# Patient Record
Sex: FEMALE | Race: WHITE | Employment: OTHER | URBAN - NONMETROPOLITAN AREA
[De-identification: names, ages, dates, MRNs, and addresses within clinical notes are randomized per-mention and may not be internally consistent; named-entity substitution may affect disease eponyms.]

---

## 2017-05-18 ENCOUNTER — HOSPITAL ENCOUNTER (OUTPATIENT)
Dept: WOMENS IMAGING | Age: 67
Discharge: HOME | End: 2017-05-18
Payer: COMMERCIAL

## 2017-05-18 DIAGNOSIS — Z12.31 SCREENING MAMMOGRAM, ENCOUNTER FOR: ICD-10-CM

## 2017-05-18 PROCEDURE — G0202 SCR MAMMO BI INCL CAD: HCPCS

## 2019-04-04 ENCOUNTER — HOSPITAL ENCOUNTER (OUTPATIENT)
Dept: WOMENS IMAGING | Age: 69
Discharge: HOME | End: 2019-04-06
Payer: MEDICARE

## 2019-04-04 DIAGNOSIS — Z12.31 VISIT FOR SCREENING MAMMOGRAM: ICD-10-CM

## 2019-04-04 PROCEDURE — 77063 BREAST TOMOSYNTHESIS BI: CPT

## 2021-04-22 ENCOUNTER — OFFICE VISIT (OUTPATIENT)
Dept: OTOLARYNGOLOGY | Age: 71
End: 2021-04-22
Payer: MEDICARE

## 2021-04-22 VITALS
SYSTOLIC BLOOD PRESSURE: 139 MMHG | BODY MASS INDEX: 22.86 KG/M2 | WEIGHT: 125 LBS | TEMPERATURE: 97.4 F | DIASTOLIC BLOOD PRESSURE: 82 MMHG | HEART RATE: 93 BPM

## 2021-04-22 DIAGNOSIS — H93.13 TINNITUS OF BOTH EARS: ICD-10-CM

## 2021-04-22 DIAGNOSIS — R42 VERTIGO: Primary | ICD-10-CM

## 2021-04-22 DIAGNOSIS — M54.2 POSTERIOR NECK PAIN: ICD-10-CM

## 2021-04-22 PROCEDURE — 99204 OFFICE O/P NEW MOD 45 MIN: CPT | Performed by: PHYSICIAN ASSISTANT

## 2021-04-22 ASSESSMENT — ENCOUNTER SYMPTOMS
SINUS PRESSURE: 0
RECTAL PAIN: 0
SHORTNESS OF BREATH: 0
BACK PAIN: 0
EYE PAIN: 0
ABDOMINAL DISTENTION: 0
FACIAL SWELLING: 0
CHEST TIGHTNESS: 0
VOMITING: 0
SORE THROAT: 0
VOICE CHANGE: 0
CONSTIPATION: 0
ANAL BLEEDING: 0
BLOOD IN STOOL: 0
PHOTOPHOBIA: 0
APNEA: 0
RHINORRHEA: 1
SINUS PAIN: 0
COLOR CHANGE: 0
ABDOMINAL PAIN: 0
EYE REDNESS: 0
CHOKING: 0
EYE DISCHARGE: 0
TROUBLE SWALLOWING: 0
EYE ITCHING: 0
COUGH: 0
STRIDOR: 0
WHEEZING: 0
DIARRHEA: 0
NAUSEA: 0

## 2021-04-22 NOTE — PROGRESS NOTES
[facility] [address] ENT PART OF [facility]  [address] [facility]. [address] [place] [address]  Dept: [phone]   GENARO Singer MD (supervising physician)    Jair Mendiola 69 y.o. female     Patient presents with a chief complaint of New Patient (c/o ringing in right ear, dizziness)     CC documented by nursing staff and noted. /82   Pulse 93   Temp 97.4 °F (36.3 °C)   Wt 125 lb (56.7 kg)   BMI 22.86 kg/m²       History of Presenting Illness: The patient/caregiver reports a history of complaint with the following features: Onset:  Started 6 months ago. One day Pt was just sitting in a chair watching TV and the room started spinning. Denies triggers at that time, including denies that head or neck movement triggered symptoms. Says she got up and took Aleve-D (took 2 Aleve-D). Aleve-D helps. Frequency:  Frequency of episodes of dizziness was once every 3 weeks up until last week when she had an episode and then had one again this week 3 days ago (today is a Thursday). With the episode 3 days ago was out running around doing errands. Timing:  Will first notice neck getting sore and then gets the dizziness and ringing. Duration:  The dizziness further described as the room spins. Lasts a couple hours. The spinning is constant during that time. Quality/Location:  Dizziness - spinning of external environment. Pt says the back of her neck \"killing me\" the past couple days and \"I was dizzy\". Had neck pain 2 days last week and then again this Monday (today is a Thursday). \"A little bit\" of active neck pain right now. Sometimes the pain radiates down to the shoulders. The neck pain described as an aching pain and Pt says it feels \"swelled\" and with \"pressure back there\". Says she has \"arthritis\" in the back of her neck. Neck pain \"probably\" started about 6 months ago per Pt. Neck started hurting all of a sudden.   When she saw ENT Dr. Taylor Howe in the past years ago doesn't recall neck pain being an issue at that time. Just recalls dizziness at that time. Neck pain does not occur daily. Currently with pressure left anterior-lateral neck inferior to jaw area and present since last week (comes and goes). This pressure lasts maybe a day or so. \"Ringing\" in both ears (R > L). Ringing x years, but has gotten worse within the past 2-3 years. Constant ringing and can get loud to where it affects her hearing. When ringing gets louder seems to have the dizziness. I ask Pt if she feels like her neck complaints are related and Pt indicates that she will first notice her neck getting \"sore\" and then will get dizziness and ringing. Denies hearing loss otherwise (other than when ringing gets loud can be hard to hear). No obvious difference in the hearing between the ears (hearing seems the same in both ears). Severity:  No associated falls. Avoids driving if symptoms get \"bad\". Denies LOC. Neck pain (\"dull\") currently rated at 3-4/10. Three days ago it was around 6-7/10. Associated symptoms/other symptoms:  Otalgia? Denies  Ear drainage? Denies  Ear fullness or pressure? Not currently, but \"sometimes\". Usually affects the right ear. If does Valsalva maneuver sometimes ear will \"pop\" and relieve the pressure. N/V? Denies  Headache? Yes, from base of skull on down to neck. Posterior neck stiffness. Reports posterior neck \"crunching\". Sometimes limited AROM with neck. Says right now moving neck around can make her lightheaded. When has dizziness rolling to the left doesn't affect her, but when rolls to the right can get spinning that lasts a second when rolling to the right. Vision changes/disturbance? If reading for a long period of time or wakes up after sleeping can notice blurred vision. Denies vision changes in association with the dizziness. When gets the dizziness says her eyelids are half shut.   Pt can't Inability: Never true    Transportation needs     Medical: No     Non-medical: No   Tobacco Use    Smoking status: Never Smoker    Smokeless tobacco: Never Used   Substance and Sexual Activity    Alcohol use: Yes     Frequency: Monthly or less     Drinks per session: 3 or 4     Binge frequency: Less than monthly    Drug use: No    Sexual activity: Not on file   Lifestyle    Physical activity     Days per week: 3 days     Minutes per session: 20 min    Stress: Not at all   Relationships    Social connections     Talks on phone: Three times a week     Gets together:  Three times a week     Attends Mosque service: More than 4 times per year     Active member of club or organization: Yes     Attends meetings of clubs or organizations: More than 4 times per year     Relationship status:     Intimate partner violence     Fear of current or ex partner: No     Emotionally abused: No     Physically abused: No     Forced sexual activity: No   Other Topics Concern    Not on file   Social History Narrative    Not on file     Family History   Problem Relation Age of Onset    Diabetes Mother     Cancer Father     Heart Disease Father         PHYSICAL EXAM:    The patient was examined today 4/22/2021 with findings as follows:    CONSTITUTIONAL:    General Appearance: well-appearing, nontoxic, alert, no acute distress     Communication: understanding at normal conversational tones, normal voicing, speech intelligible    HEAD/FACE:    Head: atraumatic, normocephalic; no lesions, rash or erythema    Facial Inspection: no lesions, healthy skin    Facial Strength: motor strength normal, symmetric strength, symmetric movement    Sinuses: no sinus tenderness    Salivary Glands: no enlargement of parotid glands, no tenderness of parotid glands, no masses of parotid glands, no enlargement of submandibular glands, no tenderness of submandibular glands, no masses of submandibular glands    Temporomandibular Joint: faint crepitus with motion bilaterally, no tenderness on palpation, no trismus, motion symmetric    EYES:  PERRLA, extra-ocular movements intact, no nystagmus, sclera white, no redness of eyes, no watering of eyes  EOMI in all directions.     EARS:    Bilateral External Ears: no pits, no tags    Right External Ear: normally formed, no lesions; no mastoid tenderness, redness or swelling    Left External Ear: normally formed, no lesions; no mastoid tenderness, redness or swelling    Right External Auditory Canal: normally formed, no erythema, no edema, no lesions, healthy skin, no obstructing cerumen, no discharge    Left External Auditory Canal: normally formed, no erythema, no edema, no lesions, healthy skin, no obstructing cerumen, no discharge    Right Tympanic Membrane: normal landmarks, translucent, mobile to pneumatic otoscopy, no perforation, no effusion, no redness or injection, TM not bulging or retracted    Left Tympanic Membrane: normal landmarks, translucent, mobile to pneumatic otoscopy, no perforation, no effusion, no redness or injection, TM not bulging or retracted    Hearing: intact to spoken voice, intact to finger rub left ear only, Malloy lateralizes to the left, Right Ear: Rinne AC>BC, Left Ear: Rinne AC>BC    NOSE:    Nasal Skin: no lesions, no lacerations, no scars    Nasal Dorsum: symmetric with no visible or palpable deformities    Nasal Tip: normal symmetric nasal tip, normal nasal valves    Nasal Mucosa: normal, pink and moist, no lesions or masses, no drainage    Septum: not markedly deformed, midline, no exposed vessels, no bleeding, no septal granuloma, no perforation    Turbinates: normal size and conformation    ORAL CAVITY/MOUTH:    Lips, teeth, gums: normal lips, normal gums, dentition intact    Oral Mucosa: normal, moist, no lesions    Palate: normal hard palate, normal soft palate, symmetric palatal elevation    Floor of Mouth: normal floor of mouth    Tongue: normal tongue, no lesions, no edema, no masses, normal mucosa, mobile    Tonsils: normal tonsils, no enlargement, symmetric, no lesions or masses, no erythema, no exudate    Posterior pharynx: normally formed, no lesions or masses, no erythema, no exudate, no PND    NECK:    Neck: no masses, trachea midline, functional active range of motion, no cysts or pits, voices tenderness to palpation of left superior and mid SCM muscle but no tenderness on the right  Decreased extension (indicates she can feel lightheaded with looking up, but does not feel this way currently). Pt indicates that extension \"kind of\" makes the posterior neck pain worse (skull base and neck). Good cervical AROM with forward flexion. Cervical AROM with lateral rotation to the right and left is decent to good and Pt denies symptoms. Cervical AROM with lateral flexion is decent to good bilaterally without symptoms. Thyroid: normal thyroid, no enlargement, no tenderness, no nodules    LYMPH NODES:    Cervical: no palpable lymph node enlargement    RESPIRATORY:    Inspection/Auscultation: good air movement (anteriorly, posteriorly, laterally), chest expands symmetrically, normal breath sounds, no wheezing, no stridor, no rhonchi, no crackles    CARDIOVASCULAR SYSTEM:  Heart regular rate and rhythm, normal S1 and S2, no m/r/g  No carotid thrills, no carotid bruits    Observation/Palpation of Peripheral Vascular System:  no cyanosis, no edema    MUSCULOSKELETAL SYSTEM:    Musculoskeletal System: all 4 extremities intact, extremity range of motion grossly intact, no gross motor deficit   strength 5/5 bilaterally. Shoulder strength with adduction and abduction 5/5 bilaterally. Elbow strength with extension, flexion, adduction and abduction 5/5 bilaterally. Wrist strength with extension and flexion 5/5 bilaterally. Hip strength with adduction, abduction, extension, and flexion 5/5 bilaterally. Knee strength with extension and flexion 5/5 bilaterally.   Ankle strength with dorsiflexion and plantar flexion 5/5 bilaterally. When going from lying (supine) position to a sitting position Pt is able to do so on her own with minimal assistance of the armrests. SKIN:    General Appearance:  warm and dry    NEUROLOGICAL SYSTEM:    Orientation: oriented to time, oriented to place, oriented to person, oriented to situation    Cranial Nerves: Cranial Nerves II-XII intact, normal facial movement, normal shoulder shrug, and normal laryngeal elevation    Normal gait without ataxia and gait is not antalgic. Normal finger-to-nose pointing bilaterally with eyes open, eyes closed, and with moving target. Negative Romberg test.  No dysdiadochokinesia of upper extremities. Once during tandem gait had a mild loss of balance with mild displacement of feet, but no problem otherwise. Fargo-Hallpike testing negative (no symptoms with left ear down, turned from left ear down to right ear down and Pt indicated 1 second of lightheadedness but no other symptoms and no nystagmus, repeated with going from sitting position to right ear down and no symptoms). Fukuda step test normal (30 steps taken; mild imbalance at the 11th step but no rotation). No ocular pursuit defects. No head-shaking test induced nystagmus. PSYCHIATRIC:    Mood and affect: Affect appropriate for situation/setting. Assessment and Plan:  I have advised the patient and/or caregiver that I suspect her vertigo symptoms may be related to her neck arthritis given H & P. We have also discussed that the symptoms can arise from abnormalities of the balance organ of the ear, vision impairment, poor coordination of movements by the brain, weakness or reduced sensation of the extremities, or other causes related to metabolic abnormalities or circulatory problems. We have discussed that other evaluation may be needed and that ongoing follow-up is recommended.   The patient and/or caregiver is to notify the office if no of both ears  Ambulatory referral to Audiology   3. Posterior neck pain  Ambulatory referral to Audiology      Return for follow-up after having formal hearing evaluation.

## 2021-04-22 NOTE — PROGRESS NOTES
Review of Systems   Constitutional: Negative for activity change, appetite change, chills, diaphoresis, fatigue, fever and unexpected weight change. HENT: Positive for hearing loss (bilateral), postnasal drip, rhinorrhea and tinnitus (bilateral). Negative for congestion, dental problem, drooling, ear discharge, ear pain, facial swelling, mouth sores, nosebleeds, sinus pressure, sinus pain, sneezing, sore throat, trouble swallowing and voice change. Eyes: Negative for photophobia, pain, discharge, redness, itching and visual disturbance. Respiratory: Negative for apnea, cough, choking, chest tightness, shortness of breath, wheezing and stridor. Cardiovascular: Negative for chest pain, palpitations and leg swelling. Gastrointestinal: Negative for abdominal distention, abdominal pain, anal bleeding, blood in stool, constipation, diarrhea, nausea, rectal pain and vomiting. Endocrine: Negative for cold intolerance, heat intolerance, polydipsia, polyphagia and polyuria. Genitourinary: Negative for decreased urine volume, difficulty urinating, dyspareunia, dysuria, enuresis, flank pain, frequency, genital sores, hematuria, menstrual problem, pelvic pain, urgency, vaginal bleeding, vaginal discharge and vaginal pain. Musculoskeletal: Positive for arthralgias, neck pain and neck stiffness. Negative for back pain, gait problem, joint swelling and myalgias. Skin: Negative for color change, pallor, rash and wound. Allergic/Immunologic: Negative for environmental allergies, food allergies and immunocompromised state. Neurological: Positive for dizziness and light-headedness. Negative for tremors, seizures, syncope, facial asymmetry, speech difficulty, weakness, numbness and headaches. Hematological: Negative for adenopathy. Does not bruise/bleed easily. Psychiatric/Behavioral: Positive for agitation.  Negative for behavioral problems, confusion, decreased concentration, dysphoric mood, hallucinations, self-injury, sleep disturbance and suicidal ideas. The patient is not nervous/anxious and is not hyperactive.

## 2021-04-22 NOTE — PATIENT INSTRUCTIONS
Trial of limiting sodium to no more than 1728-5614 mg per day. Get hearing test.  Just be conscious of prolonged and repetitive postures of upper body. Avoid prolonged and repetitive postures if possible. Try and doing routine head and neck range of motion exercises several times a day. Can try heat application in addition to the ice application. Can try heat application 82-35 minutes several times a day. Try plain Aleve or can try ibuprofen but don't take both since they are in the same drug class. Avoid prolonged use of these types of drugs (NSAIDs - nonsteroidal antiinflammatory drugs) because they can affect kidney function adversely.

## 2021-05-06 ENCOUNTER — OFFICE VISIT (OUTPATIENT)
Dept: OTOLARYNGOLOGY | Age: 71
End: 2021-05-06
Payer: MEDICARE

## 2021-05-06 VITALS
WEIGHT: 125 LBS | BODY MASS INDEX: 22.86 KG/M2 | SYSTOLIC BLOOD PRESSURE: 138 MMHG | DIASTOLIC BLOOD PRESSURE: 79 MMHG | TEMPERATURE: 97.7 F | HEART RATE: 94 BPM

## 2021-05-06 DIAGNOSIS — H93.13 TINNITUS OF BOTH EARS: ICD-10-CM

## 2021-05-06 DIAGNOSIS — H90.3 BILATERAL SENSORINEURAL HEARING LOSS: ICD-10-CM

## 2021-05-06 DIAGNOSIS — R42 VERTIGO: Primary | ICD-10-CM

## 2021-05-06 DIAGNOSIS — M54.2 POSTERIOR NECK PAIN: ICD-10-CM

## 2021-05-06 PROCEDURE — 99213 OFFICE O/P EST LOW 20 MIN: CPT | Performed by: PHYSICIAN ASSISTANT

## 2021-05-06 ASSESSMENT — ENCOUNTER SYMPTOMS
EYE PAIN: 0
APNEA: 0
EYE REDNESS: 0
DIARRHEA: 0
TROUBLE SWALLOWING: 0
VOMITING: 0
BACK PAIN: 0
ABDOMINAL PAIN: 0
ABDOMINAL DISTENTION: 0
CHEST TIGHTNESS: 0
EYE DISCHARGE: 0
PHOTOPHOBIA: 0
CHOKING: 0
SINUS PAIN: 0
FACIAL SWELLING: 0
STRIDOR: 0
CONSTIPATION: 0
RECTAL PAIN: 0
ANAL BLEEDING: 0
SORE THROAT: 0
EYE ITCHING: 0
VOICE CHANGE: 0
RHINORRHEA: 1
SHORTNESS OF BREATH: 0
WHEEZING: 0
COLOR CHANGE: 0
SINUS PRESSURE: 0
NAUSEA: 0
BLOOD IN STOOL: 0
COUGH: 0

## 2021-05-06 NOTE — PROGRESS NOTES
[facility] [address] ENT PART OF [facility]  [address] [facility]. [address] [place] [address]  Dept: [phone]   GENARO Mendez MD (supervising physician)    Fatemeh Jensen 69 y.o. female     Patient presents with a chief complaint of Follow-up (f/u after audiology consult)     CC documented by nursing staff and noted. /79   Pulse 94   Temp 97.7 °F (36.5 °C)   Wt 125 lb (56.7 kg)   BMI 22.86 kg/m²           HPI from today:  Pt reports resolved vertigo/dizziness and improvement in neck complaints since last visit. Today it's \"a little stiff\" as far as neck goes. Posterior neck soreness, but the pain better since last visit (hasn't had to take Aleve since last visit here). No dizziness since last visit either. Heat application to neck tried since last visit and did/does help the back of neck (helped the soreness and stiffness). Doing cervical AROM exercises/stretching. Says doing these \"a lot\" on a daily basis. Still has tinnitus, but doesn't notice it if background noise is present (like when she is with others). Severity of tinnitus can fluctuate. Denies problems sleeping because of it. Indicates the tinnitus doesn't adversely affect her. Pt had formal hearing evaluation with Dr. Itzel Chowdhury on 4/30/21. Copy of report to be scanned into EMR. Pt had symmetrical mild to moderately-severe SNHL that sloped in the mid to high frequencies. Speech discrimination was excellent for both ears and type A tympanometry bilaterally. Daily sodium intake? Pt thinks she is getting around 2000 mg sodium per day. Reports Hx of being diagnosed with Meniere's disease in the past, but discussed at prior visit that I didn't feel her complaints were related to Meniere's. However, I had her monitor sodium intake anyway and advised to try and not get more than 2000 mg per day. Pt indicates she realized she was taking in a lot of sodium.       History of Presenting Illness from 4/22/21 (was a new Pt at that time): The patient/caregiver reports a history of complaint with the following features: Onset:  Started 6 months ago. One day Pt was just sitting in a chair watching TV and the room started spinning. Denies triggers at that time, including denies that head or neck movement triggered symptoms. Says she got up and took Aleve-D (took 2 Aleve-D). Aleve-D helps. Frequency:  Frequency of episodes of dizziness was once every 3 weeks up until last week when she had an episode and then had one again this week 3 days ago (today is a Thursday). With the episode 3 days ago was out running around doing errands. Timing:  Will first notice neck getting sore and then gets the dizziness and ringing. Duration:  The dizziness further described as the room spins. Lasts a couple hours. The spinning is constant during that time. Quality/Location:  Dizziness - spinning of external environment. Pt says the back of her neck \"killing me\" the past couple days and \"I was dizzy\". Had neck pain 2 days last week and then again this Monday (today is a Thursday). \"A little bit\" of active neck pain right now. Sometimes the pain radiates down to the shoulders. The neck pain described as an aching pain and Pt says it feels \"swelled\" and with \"pressure back there\". Says she has \"arthritis\" in the back of her neck. Neck pain \"probably\" started about 6 months ago per Pt. Neck started hurting all of a sudden. When she saw ENT Dr. Stephen Zelaya in the past years ago doesn't recall neck pain being an issue at that time. Just recalls dizziness at that time. Neck pain does not occur daily. Currently with pressure left anterior-lateral neck inferior to jaw area and present since last week (comes and goes). This pressure lasts maybe a day or so. \"Ringing\" in both ears (R > L). Ringing x years, but has gotten worse within the past 2-3 years.   Constant ringing and can get loud (gastroesophageal reflux disease)     Hyperlipidemia     Osteoarthritis        Current Outpatient Medications   Medication Sig Dispense Refill    simvastatin (ZOCOR) 40 MG tablet Take 1 tablet by mouth nightly 90 tablet 3    lisinopril (PRINIVIL;ZESTRIL) 2.5 MG tablet Take 1 tablet by mouth daily 90 tablet 3    metFORMIN (GLUCOPHAGE) 1000 MG tablet Take 1 tablet by mouth 2 times daily 180 tablet 3    omeprazole (PRILOSEC) 40 MG delayed release capsule Take 1 capsule by mouth daily 90 capsule 3    blood glucose test strips (ASCENSIA AUTODISC VI;ONE TOUCH ULTRA TEST VI) strip 1 each by In Vitro route daily One touch ultra test strips 100 each 3    Lancets MISC 1 each by Does not apply route daily 100 each 5    UNABLE TO FIND DM test strips  Testing daily 100 Act 2    aspirin EC 81 MG EC tablet Take 1 tablet by mouth daily 30 tablet 3     No current facility-administered medications for this visit.         No Known Allergies   Past Surgical History:   Procedure Laterality Date    COLONOSCOPY  2011    Dr. Eleni Ellis    SKIN CANCER EXCISION  2007    Basal Cell Skin Cancer Chest      Social History     Socioeconomic History    Marital status:      Spouse name: Not on file    Number of children: Not on file    Years of education: Not on file    Highest education level: Not on file   Occupational History    Not on file   Social Needs    Financial resource strain: Not hard at all    Food insecurity     Worry: Never true     Inability: Never true   [employer] needs     Medical: No     Non-medical: No   Tobacco Use    Smoking status: Never Smoker    Smokeless tobacco: Never Used   Substance and Sexual Activity    Alcohol use: Yes     Frequency: Monthly or less     Drinks per session: 3 or 4     Binge frequency: Less than monthly    Drug use: No    Sexual activity: Not on file   Lifestyle    Physical activity     Days per week: 3 days     Minutes per session: 20 min    Stress: Not at all   Relationships    Social connections     Talks on phone: Three times a week     Gets together:  Three times a week     Attends [employer] service: More than 4 times per year     Active member of club or organization: Yes     Attends meetings of clubs or organizations: More than 4 times per year     Relationship status:     Intimate partner violence     Fear of current or ex partner: No     Emotionally abused: No     Physically abused: No     Forced sexual activity: No   Other Topics Concern    Not on file   Social History Narrative    Not on file     Family History   Problem Relation Age of Onset    Diabetes Mother     Cancer Father     Heart Disease Father         PHYSICAL EXAM:    The patient was examined today 5/06/2021 with findings as follows:    CONSTITUTIONAL:    General Appearance: well-appearing, nontoxic, alert, no acute distress     Communication: understanding at normal conversational tones, normal voicing, speech intelligible    HEAD/FACE:    Head: atraumatic, normocephalic    Facial Inspection: no lesions, healthy skin    Facial Strength: motor strength normal, symmetric strength, symmetric movement    Salivary Glands: no enlargement of parotid glands, no tenderness of parotid glands, no masses of parotid glands, no enlargement of submandibular glands, no tenderness of submandibular glands, no masses of submandibular glands    Temporomandibular Joint:  no trismus, motion symmetric    EYES:  PERRL, extra-ocular movements intact, no nystagmus, sclera white, no redness of eyes, no watering of eyes    EARS:    Bilateral External Ears: no pits, no tags    Right External Ear: normally formed, no lesions; no mastoid tenderness, redness or swelling    Left External Ear: normally formed, no lesions; no mastoid tenderness, redness or swelling    Right External Auditory Canal: normally formed, no erythema, no edema, no lesions, healthy skin, no obstructing cerumen, no discharge    Left External Auditory Canal: normally formed, no erythema, no edema, no lesions, healthy skin, no obstructing cerumen, no discharge    Right Tympanic Membrane: normal landmarks, translucent, immobile to pneumatic otoscopy, no perforation, no effusion, no redness or injection, TM not bulging or retracted    Left Tympanic Membrane: normal landmarks, translucent, mobile to pneumatic otoscopy, no perforation, no effusion, no redness or injection, TM not bulging or retracted    Hearing: intact to spoken voice    NOSE:    Nasal Skin: no lesions, no lacerations, no scars    Nasal Dorsum: symmetric with no visible or palpable deformities    Nasal Tip: normal symmetric nasal tip, normal nasal valves    Nasal Mucosa: normal, pink and moist, no lesions or masses, no drainage    Septum: not markedly deformed, midline, no exposed vessels, no bleeding, no septal granuloma, no perforation, dry appearing mucosa of left anterior septum but otherwise moist nasal mucosa    Turbinates: normal size and conformation    ORAL CAVITY/MOUTH:    Lips, teeth, gums: normal lips, normal gums, dentition intact    Oral Mucosa: normal, moist, no lesions    Palate: normal hard palate, normal soft palate, symmetric palatal elevation    Floor of Mouth: normal floor of mouth    Tongue: normal tongue, no lesions, no edema, no masses, normal mucosa, mobile    Tonsils: normal tonsils, no enlargement, symmetric, no lesions or masses, no erythema, no exudate    Posterior pharynx: normally formed, no lesions or masses, no erythema, no exudate, no PND    NECK:    Neck: no masses, trachea midline, functional active range of motion, no cysts or pits, voices tenderness to palpation of left mid SCM muscle (no other physical response of tenderness) but no tenderness on the right     Thyroid: normal thyroid, no enlargement, no tenderness, no nodules    LYMPH NODES:    Cervical: no palpable lymph node enlargement    Observation/Palpation of Peripheral Vascular System:  no cyanosis, no edema    SKIN:    General Appearance:  warm and dry    NEUROLOGICAL SYSTEM:    Orientation: oriented to time, oriented to place, oriented to person, oriented to situation    Cranial Nerves: Cranial Nerves II-XII intact, normal facial movement    PSYCHIATRIC:    Mood and affect: Affect appropriate for situation/setting. Assessment and Plan:  Discussed that complaints were likely cervicogenic in nature (neck pain/soreness improved since last visit and vertigo/dizziness resolved since last visit; hasn't needed to take Aleve since last visit). Audiogram reviewed with Pt today. Copy of audiogram given to Pt. The causes of hearing loss are discussed in the context of the patient's history and exam findings. We have discussed that exposure to excess environmental noise can result in worsened symptoms and that hearing protection in high noise environments is recommended. We have discussed that hearing aids can be helpful when hearing loss is disruptive and the features of hearing loss that respond well to amplification. Borderline candidate for hearing aids. The patient and/or caregiver is able to state an understanding of these recommendations and is agreeable to the treatment plan. The causes of tinnitus are discussed in the context of the patient's history and exam findings. I have discussed the management of tinnitus with the avoidance of silence and the use of background noise for suppression such as a clock radio set between stations, a fan, television, or other sound generating device particularly if the tinnitus is causing disturbance of sleep. We have discussed that high doses of aspirin and related drugs, caffeine and other stimulants, alcohol use, and exposure to excess environmental noise can result in worsened symptoms.   We have discussed that there are a number of herbal and holistic remedies available and that although no proven benefit has been

## 2021-05-06 NOTE — PROGRESS NOTES
Review of Systems   Constitutional: Negative for activity change, appetite change, chills, diaphoresis, fatigue, fever and unexpected weight change. HENT: Positive for rhinorrhea and tinnitus. Negative for congestion, dental problem, drooling, ear discharge, ear pain, facial swelling, hearing loss, mouth sores, nosebleeds, postnasal drip, sinus pressure, sinus pain, sneezing, sore throat, trouble swallowing and voice change. Eyes: Negative for photophobia, pain, discharge, redness, itching and visual disturbance. Respiratory: Negative for apnea, cough, choking, chest tightness, shortness of breath, wheezing and stridor. Cardiovascular: Negative for chest pain, palpitations and leg swelling. Gastrointestinal: Negative for abdominal distention, abdominal pain, anal bleeding, blood in stool, constipation, diarrhea, nausea, rectal pain and vomiting. Endocrine: Negative for cold intolerance, heat intolerance, polydipsia, polyphagia and polyuria. Genitourinary: Negative for decreased urine volume, difficulty urinating, dyspareunia, dysuria, enuresis, flank pain, frequency, genital sores, hematuria, menstrual problem, pelvic pain, urgency, vaginal bleeding, vaginal discharge and vaginal pain. Musculoskeletal: Positive for arthralgias, neck pain and neck stiffness. Negative for back pain, gait problem, joint swelling and myalgias. Skin: Negative for color change, pallor, rash and wound. Allergic/Immunologic: Negative for environmental allergies, food allergies and immunocompromised state. Neurological: Positive for dizziness and light-headedness. Negative for tremors, seizures, syncope, facial asymmetry, speech difficulty, weakness, numbness and headaches. Hematological: Negative for adenopathy. Does not bruise/bleed easily.    Psychiatric/Behavioral: Negative for agitation, behavioral problems, confusion, decreased concentration, dysphoric mood, hallucinations, self-injury, sleep disturbance and suicidal ideas. The patient is not nervous/anxious and is not hyperactive.

## 2021-05-14 ENCOUNTER — HOSPITAL ENCOUNTER (OUTPATIENT)
Dept: WOMENS IMAGING | Age: 71
Discharge: HOME | End: 2021-05-16
Payer: MEDICARE

## 2021-05-14 DIAGNOSIS — Z12.31 VISIT FOR SCREENING MAMMOGRAM: ICD-10-CM

## 2021-05-14 PROCEDURE — 77063 BREAST TOMOSYNTHESIS BI: CPT

## 2021-10-15 ENCOUNTER — TELEPHONE (OUTPATIENT)
Dept: SURGERY | Age: 71
End: 2021-10-15

## 2021-10-15 NOTE — TELEPHONE ENCOUNTER
Patient scheduled directly for colonoscopy on 1/7/2022 with Dr. Kristi Desai. Prep instructions mailed and all questions answered at this time.

## 2022-02-23 ENCOUNTER — ANESTHESIA EVENT (OUTPATIENT)
Dept: OPERATING ROOM | Age: 72
End: 2022-02-23
Payer: MEDICARE

## 2022-02-24 ENCOUNTER — ANESTHESIA (OUTPATIENT)
Dept: OPERATING ROOM | Age: 72
End: 2022-02-24
Payer: MEDICARE

## 2022-02-24 ENCOUNTER — HOSPITAL ENCOUNTER (OUTPATIENT)
Age: 72
Setting detail: OUTPATIENT SURGERY
Discharge: HOME | End: 2022-02-24
Attending: SURGERY | Admitting: SURGERY
Payer: MEDICARE

## 2022-02-24 VITALS
OXYGEN SATURATION: 100 % | RESPIRATION RATE: 15 BRPM | DIASTOLIC BLOOD PRESSURE: 36 MMHG | SYSTOLIC BLOOD PRESSURE: 98 MMHG

## 2022-02-24 VITALS
HEART RATE: 67 BPM | SYSTOLIC BLOOD PRESSURE: 118 MMHG | BODY MASS INDEX: 22.26 KG/M2 | WEIGHT: 121 LBS | OXYGEN SATURATION: 100 % | HEIGHT: 62 IN | DIASTOLIC BLOOD PRESSURE: 56 MMHG | TEMPERATURE: 97.7 F | RESPIRATION RATE: 18 BRPM

## 2022-02-24 PROBLEM — Z12.11 SCREEN FOR COLON CANCER: Status: ACTIVE | Noted: 2022-02-24

## 2022-02-24 PROCEDURE — 2500000003 HC RX 250 WO HCPCS: Performed by: NURSE ANESTHETIST, CERTIFIED REGISTERED

## 2022-02-24 PROCEDURE — 7100000010 HC PHASE II RECOVERY - FIRST 15 MIN: Performed by: SURGERY

## 2022-02-24 PROCEDURE — G0105 COLORECTAL SCRN; HI RISK IND: HCPCS | Performed by: SURGERY

## 2022-02-24 PROCEDURE — 2709999900 HC NON-CHARGEABLE SUPPLY: Performed by: SURGERY

## 2022-02-24 PROCEDURE — 3700000001 HC ADD 15 MINUTES (ANESTHESIA): Performed by: SURGERY

## 2022-02-24 PROCEDURE — 6360000002 HC RX W HCPCS: Performed by: NURSE ANESTHETIST, CERTIFIED REGISTERED

## 2022-02-24 PROCEDURE — 7100000011 HC PHASE II RECOVERY - ADDTL 15 MIN: Performed by: SURGERY

## 2022-02-24 PROCEDURE — 2580000003 HC RX 258: Performed by: NURSE ANESTHETIST, CERTIFIED REGISTERED

## 2022-02-24 PROCEDURE — 3700000000 HC ANESTHESIA ATTENDED CARE: Performed by: SURGERY

## 2022-02-24 PROCEDURE — 3609027000 HC COLONOSCOPY: Performed by: SURGERY

## 2022-02-24 RX ORDER — PROPOFOL 10 MG/ML
INJECTION, EMULSION INTRAVENOUS PRN
Status: DISCONTINUED | OUTPATIENT
Start: 2022-02-24 | End: 2022-02-24 | Stop reason: SDUPTHER

## 2022-02-24 RX ORDER — PROPOFOL 10 MG/ML
INJECTION, EMULSION INTRAVENOUS CONTINUOUS PRN
Status: DISCONTINUED | OUTPATIENT
Start: 2022-02-24 | End: 2022-02-24 | Stop reason: SDUPTHER

## 2022-02-24 RX ORDER — SODIUM CHLORIDE, SODIUM LACTATE, POTASSIUM CHLORIDE, CALCIUM CHLORIDE 600; 310; 30; 20 MG/100ML; MG/100ML; MG/100ML; MG/100ML
INJECTION, SOLUTION INTRAVENOUS CONTINUOUS
Status: DISCONTINUED | OUTPATIENT
Start: 2022-02-24 | End: 2022-02-24 | Stop reason: HOSPADM

## 2022-02-24 RX ADMIN — PROPOFOL 80 MG: 10 INJECTION, EMULSION INTRAVENOUS at 15:15

## 2022-02-24 RX ADMIN — LIDOCAINE HYDROCHLORIDE 60 MG: 20 INJECTION, SOLUTION EPIDURAL; INFILTRATION; INTRACAUDAL at 15:15

## 2022-02-24 RX ADMIN — PHENYLEPHRINE HYDROCHLORIDE 100 MCG: 10 INJECTION INTRAVENOUS at 15:22

## 2022-02-24 RX ADMIN — SODIUM CHLORIDE, POTASSIUM CHLORIDE, SODIUM LACTATE AND CALCIUM CHLORIDE: 600; 310; 30; 20 INJECTION, SOLUTION INTRAVENOUS at 14:36

## 2022-02-24 RX ADMIN — PROPOFOL 160 MCG/KG/MIN: 10 INJECTION, EMULSION INTRAVENOUS at 15:15

## 2022-02-24 RX ADMIN — PROPOFOL 25 MG: 10 INJECTION, EMULSION INTRAVENOUS at 15:31

## 2022-02-24 ASSESSMENT — PAIN SCALES - GENERAL: PAINLEVEL_OUTOF10: 0

## 2022-02-24 ASSESSMENT — PAIN - FUNCTIONAL ASSESSMENT: PAIN_FUNCTIONAL_ASSESSMENT: 0-10

## 2022-02-24 ASSESSMENT — LIFESTYLE VARIABLES: SMOKING_STATUS: 0

## 2022-02-24 NOTE — PROGRESS NOTES
Discharge instructions given to pt and . Discharge Criteria    Inpatients must meet Criteria 1 through 7. All other patients are either YES or N/A. If a NO is chosen then Anesthesia or Surgeon must be notified. 1.  Minimum 30 minutes after last dose of sedative medication, minimum 120 minutes after last dose of reversal agent. Yes      2. Systolic BP stable within 20 mmHg for 30 minutes & systolic BP between 90 & 416 or within 10 mmHg of baseline. Yes      3. Pulse between 60 and 100 or within 10 bpm of baseline. Yes      4. Spontaneous respiratory rate >/= 10 per minute. Yes      5. SaO2 >/= 95 or  >/= baseline. Yes      6. Able to cough and swallow or return to baseline function. Yes      7. Alert and oriented or return to baseline mental status. Yes      8. Demonstrates controlled, coordinated movements, ambulates with steady gait, or return to baseline activity function. Yes      9. Minimal or no pain or nausea, or at a level tolerable and acceptable to patient. Yes      10. Takes and retains oral fluids as allowed. Yes      11. Procedural / perioperative site stable. Minimal or no bleeding. Yes          12. If GI endoscopy procedure, minimal or no abdominal distention or passing flatus. Yes      13. Written discharge instructions and emergency telephone number provided. Yes      14. Accompanied by a responsible adult.     Yes

## 2022-02-24 NOTE — ANESTHESIA POSTPROCEDURE EVALUATION
Department of Anesthesiology  Postprocedure Note    Patient: Amadeo Shell  MRN: [id number]  YOB: 1950  Date of evaluation: 2/24/2022  Time:  5:15 PM     Procedure Summary     Date: 02/24/22 Room / Location: [address] [place] [address]    Anesthesia Start: 4217 Anesthesia Stop: 1333    Procedure: COLORECTAL CANCER SCREENING, NOT HIGH RISK (N/A ) Diagnosis: (SCREENING)    Surgeons: Daquan Foley DO Responsible Provider: ASIYA Lackey CRNA    Anesthesia Type: general, TIVA ASA Status: 2          Anesthesia Type: general, TIVA    Raya Phase I:      Raya Phase II: Raya Score: 9    Last vitals: Reviewed and per EMR flowsheets.        Anesthesia Post Evaluation    Patient location during evaluation: PACU  Patient participation: complete - patient participated  Level of consciousness: awake and alert  Airway patency: patent  Nausea & Vomiting: no nausea and no vomiting  Complications: no  Cardiovascular status: blood pressure returned to baseline and hemodynamically stable  Respiratory status: acceptable and room air  Hydration status: euvolemic

## 2022-02-24 NOTE — H&P
GENERAL SURGERY CONSULTATION      Patient's Name/ Date of Birth/ Gender: Gume Littlejohn / 1950 (66 y.o.) / female     PCP: Fred Roberts MD  Referring:     History of present Illness:  Patient is a pleasant 66 y.o. female  kindly referred by Fred Roberts MD   Direct referral  Screening colonoscopy due, last one 10 years ago. History of colon polyps tubular adenomas. No family history colon cancer. No symptoms. Healthy. No melena or hematochezia. No changes in bowel habits. Past Medical History:  has a past medical history of DM type 2 (diabetes mellitus, type 2) ([facility] [address] Fatty liver, GERD (gastroesophageal reflux disease), Hyperlipidemia, and Osteoarthritis. Past Surgical History:   Past Surgical History:   Procedure Laterality Date    COLONOSCOPY  2011    Dr. Rachel Mares SKIN CANCER EXCISION  2007    Basal Cell Skin Cancer Chest       Social History:  reports that she has never smoked. She has never used smokeless tobacco. She reports current alcohol use. She reports that she does not use drugs. Family History: family history includes Cancer in her father; Diabetes in her mother; Heart Disease in her father.     Review of Systems:   General: Completed and, except as mentioned above, was negative or noncontributory  Psychological:  Completed and, except as mentioned above, was negative or noncontributory  Ophthalmic:  Completed and, except as mentioned above, was negative or noncontributory  ENT:  Completed and, except as mentioned above, was negative or noncontributory  Allergy and Immunology:  Completed and, except as mentioned above, was negative or noncontributory  Hematological and Lymphatic:  Completed and, except as mentioned above, was negative or noncontributory  Endocrine: Completed and, except as mentioned above, was negative or noncontributory  Breast:  Completed and, except as mentioned above, was negative or noncontributory  Respiratory:  Completed and, except as mentioned above, was negative or noncontributory  Cardiovascular:  Completed and, except as mentioned above, was negative or noncontributory  Gastrointestinal: Completed and, except as mentioned above, was negative or noncontributory  Genito-Urinary:  Completed and, except as mentioned above, was negative or noncontributory  Musculoskeletal:  Completed and, except as mentioned above, was negative or noncontributory  Neurological:  Completed and, except as mentioned above, was negative or noncontributory  Dermatological:  Completed and, except as mentioned above, was negative or noncontributory    Allergies: Patient has no known allergies. Current Meds:  Current Facility-Administered Medications:     lactated ringers infusion, , IntraVENous, Continuous, ASIYA Boogie CRNA, Last Rate: 100 mL/hr at 02/24/22 1436, New Bag at 02/24/22 1436    Physical Exam:  Vital signs and Nurse's note reviewed. BP (!) 155/82   Pulse 97   Temp 97.3 °F (36.3 °C) (Temporal)   Resp 13   Ht 5' 2\" (1.575 m)   Wt 121 lb (54.9 kg)   SpO2 97%   Breastfeeding No   BMI 22.13 kg/m²    height is 5' 2\" (1.575 m) and weight is 121 lb (54.9 kg). Her temporal temperature is 97.3 °F (36.3 °C). Her blood pressure is 155/82 (abnormal) and her pulse is 97. Her respiration is 13 and oxygen saturation is 97%. Gen:  A&Ox3, NAD. Pleasant and cooperative.   HEENT: PERRLA, EOMI, no scleral icterus  Neck:  no goiter  CVS: Regular rate and rhythm  Resp: Good bilateral air entry, no active wheezing, no labored breathing  Abd: soft, non-tender, non-distended, bowel sounds present rectal exam to be done at scope  Ext: Moves all extremities, no gross focal motor deficits  Skin: No erythema or ulcerations     Labs:   No results found for: WBC, HGB, HCT, MCV, PLT  Lab Results   Component Value Date     10/07/2021    K 4.5 10/07/2021     10/07/2021    CO2 29 10/07/2021    BUN 17 10/07/2021    CREATININE 0.80 10/07/2021    GLUCOSE 105 10/07/2021    CALCIUM 9.7 10/07/2021     Lab Results   Component Value Date    ALKPHOS 152 10/07/2021    ALKPHOS 146 07/23/2012    ALT 55 10/07/2021    AST 41 10/07/2021    PROT 6.7 10/07/2021    BILITOT 0.6 10/07/2021    BILIDIR 0.18 07/23/2012    LABALBU 4.6 10/07/2021     No results found for: AMYLASE  No results found for: LIPASE  No results found for: INR    Radiologic Studies:      Impressions/Recommendations:     Screening colonoscopy  Risks and benefits of colonoscopy have been discussed in detail with the patient. Risks of the procedure include bleeding, bowel perforation, possibly missing smaller lesions if the bowel prep is not adequate. Alternative studies include barium enema and virtual colonoscopy; however, if a lesion is seen, then one would still need to proceed with the gold standard colonoscopy to retrieve or biopsy the lesion. All the patient's questions are answered. Will proceed with colonoscopy under MAC. H&P  General Surgery        Pt Name: Flora Chang  MRN: [id number]  YOB: 1950  Date of evaluation: 2/24/2022      [x] I have examined the patient and reviewed the H&P/Consult completed, and there are no changes to the patient or plans. [] I have examined the patient and reviewed the H&P/Consult and have noted the following changes: The patient was counseled at length about the risks of arsh Covid-19 during their perioperative period and any recovery window from their procedure. The patient was made aware that arsh Covid-19  may worsen their prognosis for recovering from their procedure  and lend to a higher morbidity and/or mortality risk. All material risks, benefits, and reasonable alternatives including postponing the procedure were discussed. The patient does wish to proceed with the procedure at this time.         Electronically signed by Minda Fuentes DO  on 2/24/2022 at 2:56 PM

## 2022-02-24 NOTE — BRIEF OP NOTE
Brief Postoperative Note      Patient: Kenn Angeles  YOB: 1950  MRN: Brittney Torres MD      Date of Procedure: 2/24/2022    Pre-Op Diagnosis: screening colonoscopy. History of polyps    Post-Op Diagnosis: Same. Normal colon without polyps seen.        Procedure:    Colonoscopy to cecum      Surgeon(s):  Sharron Melendez DO      Anesthesia: Monitor Anesthesia Care    Estimated Blood Loss (mL): none    Complications: None    Specimens: none    Electronically signed by Sharron Melendez DO, FACOS, FACS on 2/24/2022 at 6:23 PM

## 2022-02-24 NOTE — ANESTHESIA PRE PROCEDURE
Department of Anesthesiology  Preprocedure Note       Name:  Brenda House   Age:  66 y.o.  :  1950                                          MRN:  [id number]         Date:  2022      Surgeon: Eladio Nunez):  Anne Tubbs DO    Procedure: Procedure(s):  COLORECTAL CANCER SCREENING, NOT HIGH RISK    Medications prior to admission:   Prior to Admission medications    Medication Sig Start Date End Date Taking? Authorizing Provider   omeprazole (PRILOSEC) 40 MG delayed release capsule Take 1 capsule by mouth daily 22   Jane De La Vega MD   simvastatin (ZOCOR) 40 MG tablet Take 1 tablet by mouth nightly 22  Yes Jane De La Vega MD   lisinopril (PRINIVIL;ZESTRIL) 2.5 MG tablet TAKE 1 TABLET DAILY 21  Yes Jane De La Vega MD   metFORMIN (GLUCOPHAGE) 1000 MG tablet Take 1 tablet by mouth 2 times daily (with meals) 21  Yes Jane De La Vega MD   blood glucose test strips (ASCENSIA AUTODISC VI;ONE TOUCH ULTRA TEST VI) strip 1 each by In Vitro route daily One touch ultra test strips 21   Jane De La Vega MD   blood glucose monitor strips Test once times a day & as needed for symptoms of irregular blood glucose. Dispense sufficient amount for indicated testing frequency plus additional to accommodate PRN testing needs.  21   Jane De La Vega MD   Lancets MISC 1 each by Does not apply route daily 10/28/19   Jane De La Vega MD   UNABLE TO FIND DM test strips  Testing daily 18   Jane De La Vega MD   aspirin EC 81 MG EC tablet Take 1 tablet by mouth daily 5/11/15   Jane De La Vega MD       Current medications:    Current Facility-Administered Medications   Medication Dose Route Frequency Provider Last Rate Last Admin    lactated ringers infusion   IntraVENous Continuous MyrASIYA Fernandez - CRNA 100 mL/hr at 22 1436 New Bag at 22 1436       Allergies:  No Known Allergies    Problem List:    Patient Active Problem List   Diagnosis Code    Chronic GERD K21.9    Mixed hyperlipidemia E78.2    Type 2 diabetes mellitus without complication, without long-term current use of insulin (HCC) E11.9    Tinnitus of both ears H93.13    Bilateral sensorineural hearing loss H90.3       Past Medical History:        Diagnosis Date    DM type 2 (diabetes mellitus, type 2) ([facility] [address] 2011    Fatty liver 2011    GERD (gastroesophageal reflux disease)     Hyperlipidemia     Osteoarthritis        Past Surgical History:        Procedure Laterality Date    COLONOSCOPY  2011    Dr. Shreyas Lanza SKIN CANCER EXCISION  2007    Basal Cell Skin Cancer Chest       Social History:    Social History     Tobacco Use    Smoking status: Never Smoker    Smokeless tobacco: Never Used   Substance Use Topics    Alcohol use: Yes     Comment: 2-3 per month                                Counseling given: Not Answered      Vital Signs (Current):   Vitals:    02/11/22 0943 02/24/22 1416 02/24/22 1431 02/24/22 1438   BP:   (!) 177/74 (!) 155/82   Pulse:   97    Resp:   13    Temp:   36.3 °C (97.3 °F)    TempSrc:   Temporal    SpO2:   97%    Weight: 122 lb (55.3 kg) 121 lb (54.9 kg)     Height: 5' 1.5\" (1.562 m) 5' 2\" (1.575 m)                                                BP Readings from Last 3 Encounters:   02/24/22 (!) 155/82   12/16/21 119/66   10/04/21 138/80       NPO Status: Time of last liquid consumption: 1045                        Time of last solid consumption: 1800                        Date of last liquid consumption: 02/24/22                        Date of last solid food consumption: 02/22/22    BMI:   Wt Readings from Last 3 Encounters:   02/24/22 121 lb (54.9 kg)   12/16/21 123 lb 8 oz (56 kg)   10/04/21 125 lb (56.7 kg)     Body mass index is 22.13 kg/m².     CBC: No results found for: WBC, RBC, HGB, HCT, MCV, RDW, PLT    CMP:   Lab Results   Component Value Date     10/07/2021    K 4.5 10/07/2021     10/07/2021    CO2 29 10/07/2021    BUN 17 10/07/2021    CREATININE 0.80 10/07/2021    GFRAA >60 11/19/2012    LABGLOM >60 11/19/2012    GLUCOSE 105 10/07/2021    PROT 6.7 10/07/2021    CALCIUM 9.7 10/07/2021    BILITOT 0.6 10/07/2021    ALKPHOS 152 10/07/2021    ALKPHOS 146 07/23/2012    AST 41 10/07/2021    ALT 55 10/07/2021       POC Tests: No results for input(s): POCGLU, POCNA, POCK, POCCL, POCBUN, POCHEMO, POCHCT in the last 72 hours. Coags: No results found for: PROTIME, INR, APTT    HCG (If Applicable): No results found for: PREGTESTUR, PREGSERUM, HCG, HCGQUANT     ABGs: No results found for: PHART, PO2ART, [id number], JHF9DVT, BEART, [id number]     Type & Screen (If Applicable):  No results found for: LABABO, LABRH    Drug/Infectious Status (If Applicable):  No results found for: HIV, HEPCAB    COVID-19 Screening (If Applicable): No results found for: COVID19        Anesthesia Evaluation  Patient summary reviewed and Nursing notes reviewed no history of anesthetic complications:   Airway: Mallampati: II  TM distance: >3 FB   Neck ROM: full  Mouth opening: > = 3 FB Dental:          Pulmonary:Negative Pulmonary ROS and normal exam        (-) not a current smoker                           Cardiovascular:    (+) hypertension:, hyperlipidemia                  Neuro/Psych:   Negative Neuro/Psych ROS              GI/Hepatic/Renal:   (+) GERD: well controlled,           Endo/Other:    (+) DiabetesType II DM, , .                 Abdominal:             Vascular: negative vascular ROS. Other Findings:             Anesthesia Plan      general and TIVA     ASA 2       Induction: intravenous. Anesthetic plan and risks discussed with patient. Plan discussed with CRNA.                   ASIYA Banks - CRNA   2/24/2022

## 2022-03-26 PROBLEM — Z12.11 SCREEN FOR COLON CANCER: Status: RESOLVED | Noted: 2022-02-24 | Resolved: 2022-03-26

## 2022-04-04 PROBLEM — H93.13 TINNITUS OF BOTH EARS: Status: RESOLVED | Noted: 2021-05-06 | Resolved: 2022-04-04

## 2023-09-13 ENCOUNTER — HOSPITAL ENCOUNTER (OUTPATIENT)
Age: 73
Discharge: HOME | End: 2023-09-13
Payer: MEDICARE

## 2023-09-13 DIAGNOSIS — R35.0 URINARY FREQUENCY: ICD-10-CM

## 2023-09-13 LAB
BACTERIA URNS QL MICRO: ABNORMAL
BILIRUB UR QL STRIP: NEGATIVE
CLARITY UR: ABNORMAL
COLOR UR: YELLOW
EPI CELLS #/AREA URNS HPF: ABNORMAL /HPF (ref 0–25)
GLUCOSE UR STRIP-MCNC: NEGATIVE MG/DL
HGB UR QL STRIP.AUTO: NEGATIVE
KETONES UR STRIP-MCNC: NEGATIVE MG/DL
LEUKOCYTE ESTERASE UR QL STRIP: ABNORMAL
NITRITE UR QL STRIP: NEGATIVE
PH UR STRIP: 6 [PH] (ref 5–9)
PROT UR STRIP-MCNC: NEGATIVE MG/DL
RBC #/AREA URNS HPF: ABNORMAL /HPF (ref 0–2)
SP GR UR STRIP: 1.01 (ref 1.01–1.02)
UROBILINOGEN UR STRIP-ACNC: NORMAL EU/DL (ref 0–1)
WBC #/AREA URNS HPF: ABNORMAL /HPF (ref 0–5)

## 2023-09-13 PROCEDURE — 87077 CULTURE AEROBIC IDENTIFY: CPT

## 2023-09-13 PROCEDURE — 87186 SC STD MICRODIL/AGAR DIL: CPT

## 2023-09-13 PROCEDURE — 81001 URINALYSIS AUTO W/SCOPE: CPT

## 2023-09-13 PROCEDURE — 87086 URINE CULTURE/COLONY COUNT: CPT

## 2023-09-15 LAB
MICROORGANISM SPEC CULT: ABNORMAL
SPECIMEN DESCRIPTION: ABNORMAL

## 2024-03-13 ENCOUNTER — ENROLLMENT (OUTPATIENT)
Dept: PHARMACY | Facility: CLINIC | Age: 74
End: 2024-03-13

## 2024-04-15 PROBLEM — I10 ESSENTIAL HYPERTENSION: Status: ACTIVE | Noted: 2024-04-15

## 2024-07-08 ENCOUNTER — HOSPITAL ENCOUNTER (OUTPATIENT)
Age: 74
Discharge: HOME | End: 2024-07-08
Payer: MEDICARE

## 2024-07-08 DIAGNOSIS — R35.0 URINARY FREQUENCY: ICD-10-CM

## 2024-07-08 PROCEDURE — 87086 URINE CULTURE/COLONY COUNT: CPT

## 2024-07-08 PROCEDURE — 87186 SC STD MICRODIL/AGAR DIL: CPT

## 2024-07-08 PROCEDURE — 87088 URINE BACTERIA CULTURE: CPT

## 2024-07-09 LAB
MICROORGANISM SPEC CULT: ABNORMAL
SERVICE CMNT-IMP: ABNORMAL
SPECIMEN DESCRIPTION: ABNORMAL

## (undated) DEVICE — MEDI-VAC NON-CONDUCTIVE TUBING7MM X 30.5 (100FT): Brand: CARDINAL HEALTH

## (undated) DEVICE — SOLUTION IV IRRIG POUR BRL 0.9% SODIUM CHL 2F7124

## (undated) DEVICE — CANNULA ORAL NSL AD CO2 N INTUB O2 DEL DISP TRU LNK